# Patient Record
Sex: FEMALE | URBAN - METROPOLITAN AREA
[De-identification: names, ages, dates, MRNs, and addresses within clinical notes are randomized per-mention and may not be internally consistent; named-entity substitution may affect disease eponyms.]

---

## 2017-01-25 ENCOUNTER — APPOINTMENT (OUTPATIENT)
Dept: PHYSICAL THERAPY | Facility: REHABILITATION | Age: 67
End: 2017-01-25
Payer: MEDICARE

## 2017-01-25 PROCEDURE — G8991 OTHER PT/OT GOAL STATUS: HCPCS

## 2017-01-25 PROCEDURE — G8992 OTHER PT/OT  D/C STATUS: HCPCS

## 2017-01-25 PROCEDURE — 97140 MANUAL THERAPY 1/> REGIONS: CPT

## 2017-02-06 ENCOUNTER — APPOINTMENT (OUTPATIENT)
Dept: PHYSICAL THERAPY | Facility: REHABILITATION | Age: 67
End: 2017-02-06
Payer: MEDICARE

## 2017-02-06 PROCEDURE — 97140 MANUAL THERAPY 1/> REGIONS: CPT

## 2017-02-06 PROCEDURE — G8990 OTHER PT/OT CURRENT STATUS: HCPCS

## 2017-02-06 PROCEDURE — G8991 OTHER PT/OT GOAL STATUS: HCPCS

## 2017-02-06 PROCEDURE — 97162 PT EVAL MOD COMPLEX 30 MIN: CPT

## 2017-02-14 ENCOUNTER — APPOINTMENT (OUTPATIENT)
Dept: PHYSICAL THERAPY | Facility: REHABILITATION | Age: 67
End: 2017-02-14
Payer: MEDICARE

## 2017-02-14 PROCEDURE — 97140 MANUAL THERAPY 1/> REGIONS: CPT

## 2017-02-27 ENCOUNTER — APPOINTMENT (OUTPATIENT)
Dept: PHYSICAL THERAPY | Facility: REHABILITATION | Age: 67
End: 2017-02-27
Payer: MEDICARE

## 2017-02-27 PROCEDURE — 97110 THERAPEUTIC EXERCISES: CPT

## 2017-02-27 PROCEDURE — 97140 MANUAL THERAPY 1/> REGIONS: CPT

## 2017-03-16 ENCOUNTER — APPOINTMENT (OUTPATIENT)
Dept: PHYSICAL THERAPY | Facility: REHABILITATION | Age: 67
End: 2017-03-16
Payer: MEDICARE

## 2017-03-16 PROCEDURE — 97140 MANUAL THERAPY 1/> REGIONS: CPT

## 2017-03-16 PROCEDURE — 97110 THERAPEUTIC EXERCISES: CPT

## 2017-03-16 PROCEDURE — G8990 OTHER PT/OT CURRENT STATUS: HCPCS

## 2017-03-16 PROCEDURE — G8991 OTHER PT/OT GOAL STATUS: HCPCS

## 2017-03-24 ENCOUNTER — APPOINTMENT (OUTPATIENT)
Dept: PHYSICAL THERAPY | Facility: REHABILITATION | Age: 67
End: 2017-03-24
Payer: MEDICARE

## 2017-03-24 PROCEDURE — 97140 MANUAL THERAPY 1/> REGIONS: CPT

## 2017-04-03 ENCOUNTER — APPOINTMENT (OUTPATIENT)
Dept: PHYSICAL THERAPY | Facility: REHABILITATION | Age: 67
End: 2017-04-03
Payer: MEDICARE

## 2017-04-03 PROCEDURE — 97140 MANUAL THERAPY 1/> REGIONS: CPT

## 2017-04-06 ENCOUNTER — APPOINTMENT (OUTPATIENT)
Dept: PHYSICAL THERAPY | Facility: REHABILITATION | Age: 67
End: 2017-04-06
Payer: MEDICARE

## 2017-04-07 ENCOUNTER — APPOINTMENT (OUTPATIENT)
Dept: PHYSICAL THERAPY | Facility: REHABILITATION | Age: 67
End: 2017-04-07
Payer: MEDICARE

## 2017-04-21 ENCOUNTER — APPOINTMENT (OUTPATIENT)
Dept: PHYSICAL THERAPY | Facility: REHABILITATION | Age: 67
End: 2017-04-21
Payer: MEDICARE

## 2017-04-21 PROCEDURE — G8991 OTHER PT/OT GOAL STATUS: HCPCS

## 2017-04-21 PROCEDURE — G8992 OTHER PT/OT  D/C STATUS: HCPCS

## 2017-04-21 PROCEDURE — 97140 MANUAL THERAPY 1/> REGIONS: CPT

## 2024-06-26 NOTE — PROGRESS NOTES
PT Evaluation     Today's date: 2024  Patient name: Payton Ortiz  : 1950  MRN: [id number]  Referring provider: Alia Salmon C*  Dx:   Encounter Diagnosis     ICD-10-CM    1. Pelvic pain  R10.2       2. Pelvic floor tension  M62.89       3. Mixed stress and urge urinary incontinence  N39.46                      Assessment  Impairments: abnormal coordination, abnormal muscle firing, abnormal muscle tone, abnormal or restricted ROM, activity intolerance, impaired physical strength, lacks appropriate home exercise program and pain with function  Symptom irritability: moderate    Assessment details: Payton Ortiz is a 73 y.o. female who presents with concerns of pelvic pain, dyspareunia and urinary leakage. Examination reveals pelvic muscle tension, poor holding endurance, tenderness to palpation, poor cooridnation with deep core stabilizers.       The plan of care was discussed and included education regarding pelvic floor anatomy, explanation of exam technique, explanation of exam findings and discussion of treatment plan as well as the importance of patient compliance and adherence to physical therapy visits. Patient would benefit from skilled physical therapy services  to address deficits and ultimately meet goal of independent self management of condition.     Patient provided written and verbal consent for pelvic floor muscle exam: yes        Understanding of Dx/Px/POC: good     Prognosis: good    Goals    STGs to be met in 4 weeks:  * Patient will be compliant with introductory HEP as prescribed.  * Patient presents with good understanding of pelvic floor protective strategies to reduce intra-abdominal pressure against pelvic organs.    LTGs to be met by discharge:  * Patient will present with a NATHAN on the PFDI to indicate improved function. (nonsurg NATHAN > 13.5 pts, surg NATHAN > 45 pts)   * Demonstrates correct isolation and relaxation of pelvic floor to palpation without  "overflow from global stabilizers.  * Reports that she/he can cough/laugh/sneeze with at least 80% less bladder leakage.  * Implements relaxation strategies on a daily basis.  * Patient will report % reduction in discomfort with either palpation or intimacy.  * Patient will be proficient and compliant with use of vaginal dilator (s) for progression of self PFM stretching in 3-4 visits if indicated during treatment.    * Presents with improved nocturia to 1 toiletings/night for more thorough sleep.   * Patient will use pelvic floor muscles correctly during functional ADLs such as coughing, sneezn.     Plan  Referral necessary: Yes  Planned modality interventions: biofeedback    Planned therapy interventions: manual therapy, motor coordination training, neuromuscular re-education, strengthening, stretching, therapeutic activities, therapeutic exercise and home exercise program    Frequency: 1x week  Duration in weeks: 12  Plan of Care beginning date: 9/19/2024    PT Pelvic Floor Subjective:   History of Present Illness:   Previous patient returns with concern of bladder leakage and dyspareunia. She notes that she hasn't had intercourse in 6 months due to pain levels.     Current treatments include Traumeel injections, Noy Karyna treatment and estradiol cream.     Bladder: pad which she changes a few times a day \"it feels damp and I think I might drip,\" urinary hesitancy and feels as though she doesn't empty her bladder completely, UI with hard sneeze or with urge after 2 1/2 hours. Nocturia: 1-2 times   Hydration: water 60 ounces; tea - 2 cups    Reports that she was recently taking care of 2 elderly family members who passed away. Her stress levels have been high for this reason.      -----------------------------------------------------------------------------------------  4/11/24 note with SOLOMON Salmon:    HPI:   Payton presents for f/u with a hx of interlabial fissuring, dyspareunia, PFD, PVD and vulvar dermatoses " s/p radiation for anal CA.      PFMs She feels best when she is using Estradiol in conjunction with the MLT due to the radiation. Resumed Estradiol and has been consistent with BIW use to the vestibule and q wk PV. Also d/c'd Spirolactone which has likely helped to improve tissue integrity.    PFMs have been tighter with lack of MFR.  Has been having increased urinary frequency and incomplete emptying recently.    Has denied any dyspareunia since MLT. Completed 3 additional MLT tx's 3/24/22 & 5/13/22, 7/5/22 & 10/20/22, 5/25/23. Last annual 1/11/24 at 7.5 mo interval. Plan for 6-7 next.        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     Did f/u with Dr Mooney, [facility] u/s and exam nl and bleeding attributed to changes consistent with post radiation.   Quality of life: good    Social Support:     Lives in:  Multiple-level home    Lives with:  Spouse    Relationship status: /committed  Hand dominance:  Right  Diet and Exercise:      None due to time     Not exercising due to: lack of time  OB/ gyn History    Gestational History:     Number of prior pregnancies: 4    Number of term pregnancies: 2    Delivery Complications:  Living children are 50 and 49 yo. She lost 2 babies shortly after birth.   Bowel Function:     Voiding DIfficulties: constipation      Bowel Function comments:  Anal cancer in 2009 - treated with radiation and chemotherapy    Bowel frequency: daily    [place] Stool Scale: type 4 and type 3  Sexual Function:     Sexually Active:  Sexually active    Pain during intercourse: Yes      Lubrication Use: Yes    pain causes abstinence    Patient wishes to return to having intercourse: currently unable to have intercourse but wants toSexual function: vaginal painRecently saw GYN who recommended that she use coconut oil for vaginal dryness.     She has estradiol cream which she wants to talk to Alia about increasing amount.  ":  Pain:     Current pain ratin    At best pain ratin    At worst pain ratin    Location:  Vaginal, lower pelvic pain    Onset:  More than 2 years ago    Quality:  Sharp    Aggravating factors:  Unknown    Duration of symptoms:  6 to 24 hours    Relieving factors:  Change in position    Progression:  No change  Diagnostic Tests:     None    Treatments:     None    Patient Goals:     Patient goals for therapy:  Improved pain management, improved quality of life, relaxation, improved comfort, improved bladder or bowel function, fully empty bladder or bowels and decreased pain    Objective       Abdominal Assessment:      Abdominal Assessment: NA      General Perineum Exam:   Positive for perianal erythema.     General perineum exam comments: No discharge, irritation, organ prolapse or skin breakdown evident. Vitilago evident throughout perineum and gluteal muscles.     Reports burning pain along R caudal labia that rates 5-6/10. \"That is the area that hurts.\"     Also reports discomfort rating 4/10 along R IC and 5/10 along L IC and periurethral tissue.     Visual Inspection of Perineum:   Excursion of perineal body in cephalad direction with contraction of pelvic floor muscles (PFM): delayed  and weak  Excursion of perineal body in caudal direction with relaxation of pelvic floor muscles (PFM): weak and fair   Involuntary contraction with coughing: no  Involuntary relaxation with bearing down: yes  Cough reflex: no cough reflex  Sphincter Tone Resting: normal  Sphincter Tone Squeeze: normal  Sensation: intact  Tenderness: provoked    Pelvic Organ Prolapse   no pelvic organ prolapse   Atrophic changes: erythema noted       Pelvic Floor Muscle Exam:     Muscle Contraction: unable to perform   Breathing pattern with contraction: holding breath   Pelvic floor muscle relaxation is delayed and incomplete.   50% pelvic floor relaxation        PERFECT Score   Power right: 2/5   Power left: 2/5   Endurance " (seconds to max): 4   Repetitions (before fatigue): 4        pelvic floor exam consent given by patient    Pelvic exam completed: vaginally     SMEG Biofeedback   to be assessed next treatment    Graphical documentation:     Recently saw GYN who recommended that she use coconut oil for vaginal dryness.     She has estradiol cream which she wants to talk to Alia about increasing amount.              Precautions: There is no problem list on file for this patient.      PRO EVAL RE-EVAL DISCHARGE   PFDI 155.21     WILFREDO-18      VPQ      CPSI-NIH      PGQ          POC Expires Auth Status Start Date Exp Date PT Visit Limit DA expires DA provider                  Date of Service 6/27           Visits Used            Visits Remaining                        Manuals                                                            Neuro Re-Ed                                                                                                Ther Ex                                                                                    Ther Activity            Education Anatomy and POC                                                                        Modalities

## 2024-06-27 ENCOUNTER — EVALUATION (OUTPATIENT)
Dept: PHYSICAL THERAPY | Facility: REHABILITATION | Age: 74
End: 2024-06-27
Payer: COMMERCIAL

## 2024-06-27 DIAGNOSIS — R10.2 PELVIC PAIN: Primary | ICD-10-CM

## 2024-06-27 DIAGNOSIS — M62.89 PELVIC FLOOR TENSION: ICD-10-CM

## 2024-06-27 DIAGNOSIS — N39.46 MIXED STRESS AND URGE URINARY INCONTINENCE: ICD-10-CM

## 2024-06-27 PROCEDURE — 97530 THERAPEUTIC ACTIVITIES: CPT | Performed by: PHYSICAL THERAPIST

## 2024-06-27 NOTE — LETTER
2024    [facility]  [address] [facility] [address]    Patient: Payton Ortiz   YOB: 1950   Date of Visit: 2024     Encounter Diagnosis     ICD-10-CM    1. Pelvic pain  R10.2       2. Pelvic floor tension  M62.89       3. Mixed stress and urge urinary incontinence  N39.46           Dear Ms. Salmon:    Thank you for your recent referral of Payton Ortiz. Please review the attached evaluation summary from Payton's recent visit.     Please verify that you agree with the plan of care by signing the attached order.     If you have any questions or concerns, please do not hesitate to call.     I sincerely appreciate the opportunity to share in the care of one of your patients and hope to have another opportunity to work with you in the near future.       Sincerely,    Claudette Farris, PT      Referring Provider:      I certify that I have read the below Plan of Care and certify the need for these services furnished under this plan of treatment while under my care.                    [facility] Nassar  [address] [place] [address]  Via Fax: [phone]          PT Evaluation     Today's date: 2024  Patient name: Payton Ortiz  : 1950  MRN: [id number]  Referring provider: Alia Salmon C*  Dx:   Encounter Diagnosis     ICD-10-CM    1. Pelvic pain  R10.2       2. Pelvic floor tension  M62.89       3. Mixed stress and urge urinary incontinence  N39.46                      Assessment  Impairments: abnormal coordination, abnormal muscle firing, abnormal muscle tone, abnormal or restricted ROM, activity intolerance, impaired physical strength, lacks appropriate home exercise program and pain with function  Symptom irritability: moderate    Assessment details: Payton Ortiz is a 73 y.o. female who presents with concerns of pelvic pain, dyspareunia and urinary leakage. Examination reveals pelvic muscle tension,  poor holding endurance, tenderness to palpation, poor cooridnation with deep core stabilizers.       The plan of care was discussed and included education regarding pelvic floor anatomy, explanation of exam technique, explanation of exam findings and discussion of treatment plan as well as the importance of patient compliance and adherence to physical therapy visits. Patient would benefit from skilled physical therapy services  to address deficits and ultimately meet goal of independent self management of condition.     Patient provided written and verbal consent for pelvic floor muscle exam: yes        Understanding of Dx/Px/POC: good     Prognosis: good    Goals    STGs to be met in 4 weeks:  * Patient will be compliant with introductory HEP as prescribed.  * Patient presents with good understanding of pelvic floor protective strategies to reduce intra-abdominal pressure against pelvic organs.    LTGs to be met by discharge:  * Patient will present with a NATHAN on the PFDI to indicate improved function. (nonsurg NATHAN > 13.5 pts, surg NATHAN > 45 pts)   * Demonstrates correct isolation and relaxation of pelvic floor to palpation without overflow from global stabilizers.  * Reports that she/he can cough/laugh/sneeze with at least 80% less bladder leakage.  * Implements relaxation strategies on a daily basis.  * Patient will report % reduction in discomfort with either palpation or intimacy.  * Patient will be proficient and compliant with use of vaginal dilator (s) for progression of self PFM stretching in 3-4 visits if indicated during treatment.    * Presents with improved nocturia to 1 toiletings/night for more thorough sleep.   * Patient will use pelvic floor muscles correctly during functional ADLs such as coughing, sneezn.     Plan  Referral necessary: Yes  Planned modality interventions: biofeedback    Planned therapy interventions: manual therapy, motor coordination training, neuromuscular re-education,  "strengthening, stretching, therapeutic activities, therapeutic exercise and home exercise program    Frequency: 1x week  Duration in weeks: 12  Plan of Care beginning date: 9/19/2024    PT Pelvic Floor Subjective:   History of Present Illness:   Previous patient returns with concern of bladder leakage and dyspareunia. She notes that she hasn't had intercourse in 6 months due to pain levels.     Current treatments include Traumeel injections, Noy Karyna treatment and estradiol cream.     Bladder: pad which she changes a few times a day \"it feels damp and I think I might drip,\" urinary hesitancy and feels as though she doesn't empty her bladder completely, UI with hard sneeze or with urge after 2 1/2 hours. Nocturia: 1-2 times   Hydration: water 60 ounces; tea - 2 cups    Reports that she was recently taking care of 2 elderly family members who passed away. Her stress levels have been high for this reason.      -----------------------------------------------------------------------------------------  4/11/24 note with SOLOMON Salmon:    HPI:   Payton presents for f/u with a hx of interlabial fissuring, dyspareunia, PFD, PVD and vulvar dermatoses s/p radiation for anal CA.      PFMs She feels best when she is using Estradiol in conjunction with the MLT due to the radiation. Resumed Estradiol and has been consistent with BIW use to the vestibule and q wk PV. Also d/c'd Spirolactone which has likely helped to improve tissue integrity.    PFMs have been tighter with lack of MFR.  Has been having increased urinary frequency and incomplete emptying recently.    Has denied any dyspareunia since MLT. Completed 3 additional MLT tx's 3/24/22 & 5/13/22, 7/5/22 & 10/20/22, 5/25/23. Last annual 1/11/24 at 7.5 mo interval. Plan for 6-7 next.        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     Did f/u with Dr Mooney [facility] u/s and exam nl and bleeding attributed to " changes consistent with post radiation.   Quality of life: good    Social Support:     Lives in:  Multiple-level home    Lives with:  Spouse    Relationship status: /committed  Hand dominance:  Right  Diet and Exercise:      None due to time     Not exercising due to: lack of time  OB/ gyn History    Gestational History:     Number of prior pregnancies: 4    Number of term pregnancies: 2    Delivery Complications:  Living children are 50 and 49 yo. She lost 2 babies shortly after birth.   Bowel Function:     Voiding DIfficulties: constipation      Bowel Function comments:  Anal cancer in 2009 - treated with radiation and chemotherapy    Bowel frequency: daily    [place] Stool Scale: type 4 and type 3  Sexual Function:     Sexually Active:  Sexually active    Pain during intercourse: Yes      Lubrication Use: Yes    pain causes abstinence    Patient wishes to return to having intercourse: currently unable to have intercourse but wants toSexual function: vaginal painRecently saw GYN who recommended that she use coconut oil for vaginal dryness.     She has estradiol cream which she wants to talk to Alia about increasing amount. :  Pain:     Current pain ratin    At best pain ratin    At worst pain ratin    Location:  Vaginal, lower pelvic pain    Onset:  More than 2 years ago    Quality:  Sharp    Aggravating factors:  Unknown    Duration of symptoms:  6 to 24 hours    Relieving factors:  Change in position    Progression:  No change  Diagnostic Tests:     None    Treatments:     None    Patient Goals:     Patient goals for therapy:  Improved pain management, improved quality of life, relaxation, improved comfort, improved bladder or bowel function, fully empty bladder or bowels and decreased pain    Objective       Abdominal Assessment:      Abdominal Assessment: NA      General Perineum Exam:   Positive for perianal erythema.     General perineum exam comments: No discharge, irritation,  "organ prolapse or skin breakdown evident. Vitilago evident throughout perineum and gluteal muscles.     Reports burning pain along R caudal labia that rates 5-6/10. \"That is the area that hurts.\"     Also reports discomfort rating 4/10 along R IC and 5/10 along L IC and periurethral tissue.     Visual Inspection of Perineum:   Excursion of perineal body in cephalad direction with contraction of pelvic floor muscles (PFM): delayed  and weak  Excursion of perineal body in caudal direction with relaxation of pelvic floor muscles (PFM): weak and fair   Involuntary contraction with coughing: no  Involuntary relaxation with bearing down: yes  Cough reflex: no cough reflex  Sphincter Tone Resting: normal  Sphincter Tone Squeeze: normal  Sensation: intact  Tenderness: provoked    Pelvic Organ Prolapse   no pelvic organ prolapse   Atrophic changes: erythema noted       Pelvic Floor Muscle Exam:     Muscle Contraction: unable to perform   Breathing pattern with contraction: holding breath   Pelvic floor muscle relaxation is delayed and incomplete.   50% pelvic floor relaxation        PERFECT Score   Power right: 2/5   Power left: 2/5   Endurance (seconds to max): 4   Repetitions (before fatigue): 4        pelvic floor exam consent given by patient    Pelvic exam completed: vaginally     SMEG Biofeedback   to be assessed next treatment    Graphical documentation:     Recently saw GYN who recommended that she use coconut oil for vaginal dryness.     She has estradiol cream which she wants to talk to Alia about increasing amount.              Precautions: There is no problem list on file for this patient.      PRO EVAL RE-EVAL DISCHARGE   PFDI 155.21     [id number]      VPQ      CPSI-NIH      PGQ          POC Expires Auth Status Start Date Exp Date PT Visit Limit DA expires DA provider                  Date of Service 6/27           Visits Used            Visits Remaining                        Manuals                           "                                  Neuro Re-Ed                                                                                                Ther Ex                                                                                    Ther Activity            Education Anatomy and POC                                                                        Modalities

## 2024-06-28 PROCEDURE — 97162 PT EVAL MOD COMPLEX 30 MIN: CPT | Performed by: PHYSICAL THERAPIST

## 2024-07-01 ENCOUNTER — OFFICE VISIT (OUTPATIENT)
Dept: PHYSICAL THERAPY | Facility: REHABILITATION | Age: 74
End: 2024-07-01
Payer: COMMERCIAL

## 2024-07-01 DIAGNOSIS — R10.2 PELVIC PAIN: Primary | ICD-10-CM

## 2024-07-01 DIAGNOSIS — N39.46 MIXED STRESS AND URGE URINARY INCONTINENCE: ICD-10-CM

## 2024-07-01 DIAGNOSIS — M62.89 PELVIC FLOOR TENSION: ICD-10-CM

## 2024-07-01 PROCEDURE — 97112 NEUROMUSCULAR REEDUCATION: CPT | Performed by: PHYSICAL THERAPIST

## 2024-07-01 NOTE — PROGRESS NOTES
"Daily Note     Today's date: 2024  Patient name: Payton Ortiz  : 1950  MRN: [id number]  Referring provider: Claudette Farris, PT  Dx:   Encounter Diagnosis     ICD-10-CM    1. Pelvic pain  R10.2       2. Pelvic floor tension  M62.89       3. Mixed stress and urge urinary incontinence  N39.46           Previous patient returns with concern of bladder leakage and dyspareunia. She notes that she hasn't had intercourse in 6 months due to pain levels.     Current treatments include Traumeel injections, Noy Karyna treatment and estradiol cream.     Bladder: pad which she changes a few times a day \"it feels damp and I think I might drip,\" urinary hesitancy and feels as though she doesn't empty her bladder completely, UI with hard sneeze or with urge after 2 1/2 hours. Nocturia: 1-2 times   Hydration: water 60 ounces; tea - 2 cups    Reports that she was recently taking care of 2 elderly family members who passed away. Her stress levels have been high for this reason.               Subjective: Patient will be going for Noy Karyna vaginal treatment with E [facility]steriss on 24.      Objective: See treatment diary below    Biofeedback performed in supine hooklying. Patient presents with low work tone. Performed 5 second active PFM contractions followed by 10 seconds of rest for 10 repetitions. Muscle activity during work phase measured 8.6 uV on average with the goal being > 12.0uV and muscle activity during rest phase measured 2.2 uV on average with the goal being < 2.5uV.     Issued HEP of 5 second holds, 10 seconds of rest to perform 10 times in supine position with emphasis on work and rest phase. This is to be performed 2-3 times per day.         Assessment: Tolerated treatment well. Patient would benefit from continued PT      Plan: Continue per plan of care.      Precautions: There is no problem list on file for this patient.      PRO EVAL RE-EVAL DISCHARGE   PFDI 155.21     [id number]      VPQ    "   [facility]      PGQ          POC Expires Auth Status Start Date Exp Date PT Visit Limit DA expires DA provider                  Date of Service 6/27 7/1          Visits Used            Visits Remaining                        Manuals                                                            Neuro Re-Ed            BF  55'                                                                                  Ther Ex                                                                                    Ther Activity            Education Anatomy and POC                                                                        Modalities

## 2024-07-13 NOTE — PROGRESS NOTES
"Daily Note     Today's date: 7/15/2024  Patient name: Payton Ortiz  : 1950  MRN: [id number]  Referring provider: Claudette Farris, PT  Dx:   Encounter Diagnosis     ICD-10-CM    1. Pelvic pain  R10.2       2. Pelvic floor tension  M62.89       3. Mixed stress and urge urinary incontinence  N39.46           Previous patient returns with concern of bladder leakage and dyspareunia. She notes that she hasn't had intercourse in 6 months due to pain levels.     Current treatments include Traumeel injections, Noy Karyna treatment and estradiol cream.     Bladder: pad which she changes a few times a day \"it feels damp and I think I might drip,\" urinary hesitancy and feels as though she doesn't empty her bladder completely, UI with hard sneeze or with urge after 2 1/2 hours. Nocturia: 1-2 times   Hydration: water 60 ounces; tea - 2 cups    Reports that she was recently taking care of 2 elderly family members who passed away. Her stress levels have been high for this reason.    Start Time: 1405          Subjective: Had Noy Karyna last week (every 6 months). She noticed a small amount of improvement with bladder control.       Objective: See treatment diary below    Biofeedback performed in supine hooklying. Patient presents with low work tone. Performed 5 second active PFM contractions followed by 10 seconds of rest for 10 repetitions. Muscle activity during work phase measured 8.6 uV on average with the goal being > 12.0uV and muscle activity during rest phase measured 2.2 uV on average with the goal being < 2.5uV.     Issued HEP of 5 second holds, 10 seconds of rest to perform 10 times in supine position with emphasis on work and rest phase. This is to be performed 2-3 times per day.         Assessment: Tolerated treatment well. Patient would benefit from continued PT. Less pain with manual release this visit and she did well with conversation and breath work.       Plan: Continue per plan of care.    "   Precautions: There is no problem list on file for this patient.      PRO EVAL RE-EVAL DISCHARGE   PFDI 155.21     [id number]      VPQ      CPSI-NIH      PGQ          POC Expires Auth Status Start Date Exp Date PT Visit Limit DA expires DA provider                  Date of Service 6/27 7/1 7/15         Visits Used            Visits Remaining                        Manuals            PFM release   30'         Cuing for correct C/R/bear down   15'                                 Neuro Re-Ed            BF  55'                                                                                  Ther Ex            Nustep warmup   L4x8'                                                                     Ther Activity            Education Anatomy and POC                                                                        Modalities

## 2024-07-15 ENCOUNTER — OFFICE VISIT (OUTPATIENT)
Dept: PHYSICAL THERAPY | Facility: REHABILITATION | Age: 74
End: 2024-07-15
Payer: COMMERCIAL

## 2024-07-15 DIAGNOSIS — M62.89 PELVIC FLOOR TENSION: ICD-10-CM

## 2024-07-15 DIAGNOSIS — N39.46 MIXED STRESS AND URGE URINARY INCONTINENCE: ICD-10-CM

## 2024-07-15 DIAGNOSIS — R10.2 PELVIC PAIN: Primary | ICD-10-CM

## 2024-07-15 PROCEDURE — 97140 MANUAL THERAPY 1/> REGIONS: CPT | Performed by: PHYSICAL THERAPIST

## 2024-07-15 PROCEDURE — 97110 THERAPEUTIC EXERCISES: CPT | Performed by: PHYSICAL THERAPIST

## 2024-07-22 ENCOUNTER — OFFICE VISIT (OUTPATIENT)
Dept: PHYSICAL THERAPY | Facility: REHABILITATION | Age: 74
End: 2024-07-22
Payer: COMMERCIAL

## 2024-07-22 DIAGNOSIS — R10.2 PELVIC PAIN: Primary | ICD-10-CM

## 2024-07-22 DIAGNOSIS — M62.89 PELVIC FLOOR TENSION: ICD-10-CM

## 2024-07-22 DIAGNOSIS — N39.46 MIXED STRESS AND URGE URINARY INCONTINENCE: ICD-10-CM

## 2024-07-22 PROCEDURE — 97140 MANUAL THERAPY 1/> REGIONS: CPT | Performed by: PHYSICAL THERAPIST

## 2024-07-22 PROCEDURE — 97110 THERAPEUTIC EXERCISES: CPT | Performed by: PHYSICAL THERAPIST

## 2024-07-22 NOTE — PROGRESS NOTES
"Daily Note     Today's date: 2024  Patient name: Payton Ortiz  : 1950  MRN: [id number]  Referring provider: Claudette Farris, PT  Dx:   Encounter Diagnosis     ICD-10-CM    1. Pelvic pain  R10.2       2. Pelvic floor tension  M62.89       3. Mixed stress and urge urinary incontinence  N39.46           Previous patient returns with concern of bladder leakage and dyspareunia. She notes that she hasn't had intercourse in 6 months due to pain levels.     Current treatments include Traumeel injections, Noy Karyna treatment and estradiol cream.     Bladder: pad which she changes a few times a day \"it feels damp and I think I might drip,\" urinary hesitancy and feels as though she doesn't empty her bladder completely, UI with hard sneeze or with urge after 2 1/2 hours. Nocturia: 1-2 times   Hydration: water 60 ounces; tea - 2 cups    Reports that she was recently taking care of 2 elderly family members who passed away. Her stress levels have been high for this reason.               Subjective: Had Noy Karyna last week (every 6 months). She noticed a small amount of improvement with bladder control.       Objective: See treatment diary below    Biofeedback performed in supine hooklying. Patient presents with low work tone. Performed 5 second active PFM contractions followed by 10 seconds of rest for 10 repetitions. Muscle activity during work phase measured 8.6 uV on average with the goal being > 12.0uV and muscle activity during rest phase measured 2.2 uV on average with the goal being < 2.5uV.     Issued HEP of 5 second holds, 10 seconds of rest to perform 10 times in supine position with emphasis on work and rest phase. This is to be performed 2-3 times per day.         Assessment: Tolerated treatment well. Patient would benefit from continued PT. Pain,burning, tenderness, tension along L>R which reduced with MT and focused C/R. Advised not to stop urine mid-stream. Encouraged to use her finger or " vibrator at home for C/R to gauge how she's doing.       Plan: Continue per plan of care.      Precautions: There is no problem list on file for this patient.      PRO EVAL RE-EVAL DISCHARGE   PFDI 155.21     [id number]      VPQ      CPSI-NIH      PGQ          POC Expires Auth Status Start Date Exp Date PT Visit Limit DA expires DA provider                  Date of Service 6/27 7/1 7/15 7/22        Visits Used            Visits Remaining                        Manuals            PFM release   30' 30'        Cuing for correct C/R/bear down   15' 15' advised to use vibrator or finger at home                                Neuro Re-Ed            BF  55'                                                                                  Ther Ex            Nustep warmup   L4x8' L4-8'                                                                    Ther Activity            Education Anatomy and POC                                                                        Modalities

## 2024-07-29 ENCOUNTER — APPOINTMENT (OUTPATIENT)
Dept: PHYSICAL THERAPY | Facility: REHABILITATION | Age: 74
End: 2024-07-29
Payer: COMMERCIAL

## 2024-07-31 ENCOUNTER — OFFICE VISIT (OUTPATIENT)
Dept: PHYSICAL THERAPY | Facility: REHABILITATION | Age: 74
End: 2024-07-31
Payer: COMMERCIAL

## 2024-07-31 DIAGNOSIS — M62.89 PELVIC FLOOR TENSION: ICD-10-CM

## 2024-07-31 DIAGNOSIS — N39.46 MIXED STRESS AND URGE URINARY INCONTINENCE: ICD-10-CM

## 2024-07-31 DIAGNOSIS — R10.2 PELVIC PAIN: Primary | ICD-10-CM

## 2024-07-31 PROCEDURE — 97140 MANUAL THERAPY 1/> REGIONS: CPT | Performed by: PHYSICAL THERAPIST

## 2024-07-31 PROCEDURE — 97110 THERAPEUTIC EXERCISES: CPT | Performed by: PHYSICAL THERAPIST

## 2024-07-31 NOTE — PROGRESS NOTES
"Daily Note     Today's date: 2024  Patient name: Payton Ortiz  : 1950  MRN: [id number]  Referring provider: Claudette Farris, PT  Dx:   Encounter Diagnosis     ICD-10-CM    1. Pelvic pain  R10.2       2. Pelvic floor tension  M62.89       3. Mixed stress and urge urinary incontinence  N39.46           Previous patient returns with concern of bladder leakage and dyspareunia. She notes that she hasn't had intercourse in 6 months due to pain levels.     Current treatments include Traumeel injections, Noy Karyna treatment and estradiol cream.     Bladder: pad which she changes a few times a day \"it feels damp and I think I might drip,\" urinary hesitancy and feels as though she doesn't empty her bladder completely, UI with hard sneeze or with urge after 2 1/2 hours. Nocturia: 1-2 times   Hydration: water 60 ounces; tea - 2 cups    Reports that she was recently taking care of 2 elderly family members who passed away. Her stress levels have been high for this reason.    Start Time: 1400          Subjective: Has a breast biopsy and noted vaginal bleeding afterwards. She will be seeing new GYN next week and plans to discuss with her.       Objective: See treatment diary below    Biofeedback performed in supine hooklying. Patient presents with low work tone. Performed 5 second active PFM contractions followed by 10 seconds of rest for 10 repetitions. Muscle activity during work phase measured 8.6 uV on average with the goal being > 12.0uV and muscle activity during rest phase measured 2.2 uV on average with the goal being < 2.5uV.     Issued HEP of 5 second holds, 10 seconds of rest to perform 10 times in supine position with emphasis on work and rest phase. This is to be performed 2-3 times per day.         Assessment: Tolerated treatment well. Patient would benefit from continued PT. Pain,burning along R Layer 1 and R IC with very nice release along IC. Able to engage and relax voliationally fairly well. " Discussed fact that she could bring coconut oil in next visit for manual work to see if it helps with burning as she is unsure if slippery stuff lubricant is bothering her skin.     Plan: Continue per plan of care.      Precautions: There is no problem list on file for this patient.      PRO EVAL RE-EVAL DISCHARGE   PFDI 155.21     [id number]      VPQ      CPSI-NIH      PGQ          POC Expires Auth Status Start Date Exp Date PT Visit Limit DA expires DA provider                  Date of Service 6/27 7/1 7/15 7/22 7/31       Visits Used            Visits Remaining                        Manuals            PFM release   30' 30' 45'       Cuing for correct C/R/bear down   15' 15' advised to use vibrator or finger at home                                Neuro Re-Ed            BF  55'                                                                                  Ther Ex            Nustep warmup   L4x8' L4-8' L3x10'                                                                   Ther Activity            Education Anatomy and POC                                                                        Modalities

## 2024-08-08 ENCOUNTER — OFFICE VISIT (OUTPATIENT)
Dept: PHYSICAL THERAPY | Facility: REHABILITATION | Age: 74
End: 2024-08-08
Payer: COMMERCIAL

## 2024-08-08 DIAGNOSIS — R10.2 PELVIC PAIN: Primary | ICD-10-CM

## 2024-08-08 DIAGNOSIS — M62.89 PELVIC FLOOR TENSION: ICD-10-CM

## 2024-08-08 DIAGNOSIS — N39.46 MIXED STRESS AND URGE URINARY INCONTINENCE: ICD-10-CM

## 2024-08-08 PROCEDURE — 97112 NEUROMUSCULAR REEDUCATION: CPT

## 2024-08-08 PROCEDURE — 97140 MANUAL THERAPY 1/> REGIONS: CPT

## 2024-08-08 NOTE — PROGRESS NOTES
"Daily Note     Today's date: 2024  Patient name: Payton Ortiz  : 1950  MRN: [id number]  Referring provider: Claudette Farris, PT  Dx:   Encounter Diagnosis     ICD-10-CM    1. Pelvic pain  R10.2       2. Pelvic floor tension  M62.89       3. Mixed stress and urge urinary incontinence  N39.46             Previous patient returns with concern of bladder leakage and dyspareunia. She notes that she hasn't had intercourse in 6 months due to pain levels.     Current treatments include Traumeel injections, Noy Karyna treatment and estradiol cream.     Bladder: pad which she changes a few times a day \"it feels damp and I think I might drip,\" urinary hesitancy and feels as though she doesn't empty her bladder completely, UI with hard sneeze or with urge after 2 1/2 hours. Nocturia: 1-2 times   Hydration: water 60 ounces; tea - 2 cups    Reports that she was recently taking care of 2 elderly family members who passed away. Her stress levels have been high for this reason.    Start Time: 1415  Stop Time: 1500  Total time in clinic (min): 45 minutes    Subjective: Bleeding has resolved.  Does feel she is having more lower abdominal pressure since starting PT, unsure if its related. Pt brought in coconut oil for the intravaginal stretching.     Objective: See treatment diary below    Biofeedback performed in supine hooklying. Patient presents with low work tone. Performed 5 second active PFM contractions followed by 10 seconds of rest for 10 repetitions. Muscle activity during work phase measured 8.6 uV on average with the goal being > 12.0uV and muscle activity during rest phase measured 2.2 uV on average with the goal being < 2.5uV.     Issued HEP of 5 second holds, 10 seconds of rest to perform 10 times in supine position with emphasis on work and rest phase. This is to be performed 2-3 times per day.         Assessment: Tolerated treatment well. Patient would benefit from continued PT. Use coconut oil as " planned which was well tolerated, encouraged to share with us how she felt after since it usually does not bother her during the session.  R side more tense as per patient but responded well to manual techniques.  Practiced contract, relax, gentle bear down.  Appears delayed with relaxation, cueing given to improve awareness. Performed over lower abdominal area fascia decompression post intravaginal.     Plan: Continue per plan of care.      Precautions: There is no problem list on file for this patient.      PRO EVAL RE-EVAL DISCHARGE   PFDI 155.21     [id number]      VPQ      CPSI-NIH      PGQ          POC Expires Auth Status Start Date Exp Date PT Visit Limit DA expires DA provider                  Date of Service 6/27 7/1 7/15 7/22 7/31 8/8      Visits Used            Visits Remaining                        Manuals            PFM release   30' 30' 45' 35' w/ coconut oil      Cuing for correct C/R/bear down   15' 15' advised to use vibrator or finger at home                                Neuro Re-Ed            BF  55'          C/r/bear down      10'                                                                  Ther Ex            Nustep warmup   L4x8' L4-8' L3x10'                                                                   Ther Activity            Education Anatomy and POC                                                                        Modalities

## 2024-08-12 ENCOUNTER — OFFICE VISIT (OUTPATIENT)
Dept: PHYSICAL THERAPY | Facility: REHABILITATION | Age: 74
End: 2024-08-12
Payer: COMMERCIAL

## 2024-08-12 DIAGNOSIS — N39.46 MIXED STRESS AND URGE URINARY INCONTINENCE: ICD-10-CM

## 2024-08-12 DIAGNOSIS — M62.89 PELVIC FLOOR TENSION: ICD-10-CM

## 2024-08-12 DIAGNOSIS — R10.2 PELVIC PAIN: Primary | ICD-10-CM

## 2024-08-12 PROCEDURE — 97140 MANUAL THERAPY 1/> REGIONS: CPT | Performed by: PHYSICAL THERAPIST

## 2024-08-12 NOTE — PROGRESS NOTES
"Daily Note     Today's date: 2024  Patient name: Payton Ortiz  : 1950  MRN: [id number]  Referring provider: Claudette Farris, PT  Dx:   Encounter Diagnosis     ICD-10-CM    1. Pelvic pain  R10.2       2. Pelvic floor tension  M62.89       3. Mixed stress and urge urinary incontinence  N39.46             Previous patient returns with concern of bladder leakage and dyspareunia. She notes that she hasn't had intercourse in 6 months due to pain levels.     Current treatments include Traumeel injections, Noy Karyna treatment and estradiol cream.     Bladder: pad which she changes a few times a day \"it feels damp and I think I might drip,\" urinary hesitancy and feels as though she doesn't empty her bladder completely, UI with hard sneeze or with urge after 2 1/2 hours. Nocturia: 1-2 times   Hydration: water 60 ounces; tea - 2 cups    Reports that she was recently taking care of 2 elderly family members who passed away. Her stress levels have been high for this reason.    Start Time: 1500          Subjective: Reports compliance with pelvic floor stretches 2-3 times a day. She saw gyn last week and is having a pelvic ultrasound tomorrow for abdominal pain.Has started using leg pumps for venous insufficiency. She received nebulizer from pulmonologist due to \"reactive airways and touch of asthma\"but she is having trouble remembering to use it.         Objective: See treatment diary below    Biofeedback performed in supine hooklying. Patient presents with low work tone. Performed 5 second active PFM contractions followed by 10 seconds of rest for 10 repetitions. Muscle activity during work phase measured 8.6 uV on average with the goal being > 12.0uV and muscle activity during rest phase measured 2.2 uV on average with the goal being < 2.5uV.     Issued HEP of 5 second holds, 10 seconds of rest to perform 10 times in supine position with emphasis on work and rest phase. This is to be performed 2-3 times per " day.         Assessment: Tolerated treatment well. Patient would benefit from continued PT. VM along DC and sigmoid with fullness noted along sigmoid region (this softened mildly). She reported soreness along R IC with palpation.     Plan: Continue per plan of care.      Precautions: There is no problem list on file for this patient.      PRO EVAL RE-EVAL DISCHARGE   PFDI 155.21     [id number]      VPQ      CPSI-NIH      PGQ          POC Expires Auth Status Start Date Exp Date PT Visit Limit DA expires DA provider                  Date of Service 6/27 7/1 7/15 7/22 7/31 8/8 8/12     Visits Used            Visits Remaining                        Manuals            PFM release   30' 30' 45' 35' w/ coconut oil 40'     Cuing for correct C/R/bear down   15' 15' advised to use vibrator or finger at home        VM       15' as charted                 Neuro Re-Ed            BF  55'          C/r/bear down      10'                                                                  Ther Ex            Nustep warmup   L4x8' L4-8' L3x10'                                                                   Ther Activity            Education Anatomy and POC                                                                        Modalities

## 2024-08-13 ENCOUNTER — APPOINTMENT (OUTPATIENT)
Dept: PHYSICAL THERAPY | Facility: REHABILITATION | Age: 74
End: 2024-08-13
Payer: COMMERCIAL

## 2024-08-20 ENCOUNTER — OFFICE VISIT (OUTPATIENT)
Dept: PHYSICAL THERAPY | Facility: REHABILITATION | Age: 74
End: 2024-08-20
Payer: COMMERCIAL

## 2024-08-20 DIAGNOSIS — N39.46 MIXED STRESS AND URGE URINARY INCONTINENCE: ICD-10-CM

## 2024-08-20 DIAGNOSIS — M62.89 PELVIC FLOOR TENSION: ICD-10-CM

## 2024-08-20 DIAGNOSIS — R10.2 PELVIC PAIN: Primary | ICD-10-CM

## 2024-08-20 PROCEDURE — 97110 THERAPEUTIC EXERCISES: CPT | Performed by: PHYSICAL THERAPIST

## 2024-08-20 PROCEDURE — 97140 MANUAL THERAPY 1/> REGIONS: CPT | Performed by: PHYSICAL THERAPIST

## 2024-08-20 NOTE — PROGRESS NOTES
"Daily Note     Today's date: 2024  Patient name: Payton Ortiz  : 1950  MRN: [id number]  Referring provider: Claudette Farris, PT  Dx:   Encounter Diagnosis     ICD-10-CM    1. Pelvic pain  R10.2       2. Pelvic floor tension  M62.89       3. Mixed stress and urge urinary incontinence  N39.46             Previous patient returns with concern of bladder leakage and dyspareunia. She notes that she hasn't had intercourse in 6 months due to pain levels.     Current treatments include Traumeel injections, Noy Karyna treatment and estradiol cream.     Bladder: pad which she changes a few times a day \"it feels damp and I think I might drip,\" urinary hesitancy and feels as though she doesn't empty her bladder completely, UI with hard sneeze or with urge after 2 1/2 hours. Nocturia: 1-2 times   Hydration: water 60 ounces; tea - 2 cups    Reports that she was recently taking care of 2 elderly family members who passed away. Her stress levels have been high for this reason.               Subjective: TVUS revealed thickened endometrium so she is having hesteroscopy and D&C (to be scheduled after gyn appt tomorrow)started using leg pumps for venous insufficiency. She received nebulizer from pulmonologist due to \"reactive airways and touch of asthma\"but she is having trouble remembering to use it.         Objective: See treatment diary below    Biofeedback performed in supine hooklying. Patient presents with low work tone. Performed 5 second active PFM contractions followed by 10 seconds of rest for 10 repetitions. Muscle activity during work phase measured 8.6 uV on average with the goal being > 12.0uV and muscle activity during rest phase measured 2.2 uV on average with the goal being < 2.5uV.     Issued HEP of 5 second holds, 10 seconds of rest to perform 10 times in supine position with emphasis on work and rest phase. This is to be performed 2-3 times per day.         Assessment: Tolerated treatment well. " Patient would benefit from continued PT. Less internal muscle tension and pain with palpation.we discussed fact that she could be intimate if she wanted to as her PFM was responding fairly well today.     Plan: Continue per plan of care.      Precautions: There is no problem list on file for this patient.      PRO EVAL RE-EVAL DISCHARGE   PFDI 155.21 119.8    WILFREDO-18      VPQ      CPSI-NIH      PGQ          POC Expires Auth Status Start Date Exp Date PT Visit Limit DA expires DA provider                Date of Service 6/27 7/1 7/15 7/22 7/31 8/8 8/12 8/20    Visits Used            Visits Remaining                        Manuals            PFM release   30' 30' 45' 35' w/ coconut oil 40' 30' 1/10 pain     Cuing for correct C/R/bear down   15' 15' advised to use vibrator or finger at home        VM       15' as charted 15'                Neuro Re-Ed            BF  55'          C/r/bear down      10'                                                                  Ther Ex            Nustep warmup   L4x8' L4-8' L3x10'   10'                                                                Ther Activity            Education Anatomy and POC                                                                        Modalities

## 2024-10-31 ENCOUNTER — EVALUATION (OUTPATIENT)
Dept: PHYSICAL THERAPY | Facility: REHABILITATION | Age: 74
End: 2024-10-31
Payer: COMMERCIAL

## 2024-10-31 DIAGNOSIS — R10.2 PELVIC PAIN: Primary | ICD-10-CM

## 2024-10-31 DIAGNOSIS — N39.46 MIXED INCONTINENCE URGE AND STRESS: ICD-10-CM

## 2024-10-31 DIAGNOSIS — M62.89 PELVIC FLOOR TENSION: ICD-10-CM

## 2024-10-31 PROCEDURE — 97164 PT RE-EVAL EST PLAN CARE: CPT | Performed by: PHYSICAL THERAPIST

## 2024-10-31 PROCEDURE — 97140 MANUAL THERAPY 1/> REGIONS: CPT | Performed by: PHYSICAL THERAPIST

## 2024-10-31 NOTE — PROGRESS NOTES
PT Re-Evaluation     Today's date: 10/31/2024  Patient name: Payton Ortiz  : 1950  MRN: [id number]  Referring provider: Claudette Farris, JOHN  Dx:   Encounter Diagnosis     ICD-10-CM    1. Pelvic pain  R10.2       2. Pelvic floor tension  M62.89       3. Mixed incontinence urge and stress  N39.46                        Assessment  Impairments: abnormal coordination, abnormal muscle firing, abnormal muscle tone, abnormal or restricted ROM, activity intolerance, impaired physical strength, lacks appropriate home exercise program and pain with function  Symptom irritability: moderate    Assessment details: Payton Ortiz is a 73 y.o. female who presents with concerns of pelvic pain, dyspareunia and urinary leakage. Examination reveals pelvic muscle tension, poor holding endurance, tenderness to palpation, poor cooridnation with deep core stabilizers.       The plan of care was discussed and included education regarding pelvic floor anatomy, explanation of exam technique, explanation of exam findings and discussion of treatment plan as well as the importance of patient compliance and adherence to physical therapy visits. Patient would benefit from skilled physical therapy services  to address deficits and ultimately meet goal of independent self management of condition.     Patient provided written and verbal consent for pelvic floor muscle exam: yes        Understanding of Dx/Px/POC: good     Prognosis: good    Goals    STGs to be met in 4 weeks:  * Patient will be compliant with introductory HEP as prescribed.  * Patient presents with good understanding of pelvic floor protective strategies to reduce intra-abdominal pressure against pelvic organs.    LTGs to be met by discharge:  * Patient will present with a NATHAN on the PFDI to indicate improved function. (nonsurg NATHAN > 13.5 pts, surg NATHAN > 45 pts)   * Demonstrates correct isolation and relaxation of pelvic floor to palpation without overflow from  "global stabilizers.  * Reports that she/he can cough/laugh/sneeze with at least 80% less bladder leakage.  * Implements relaxation strategies on a daily basis.  * Patient will report % reduction in discomfort with either palpation or intimacy.  * Patient will be proficient and compliant with use of vaginal dilator (s) for progression of self PFM stretching in 3-4 visits if indicated during treatment.    * Presents with improved nocturia to 1 toiletings/night for more thorough sleep.   * Patient will use pelvic floor muscles correctly during functional ADLs such as coughing, sneezn.     Plan  Referral necessary: Yes  Planned modality interventions: biofeedback    Planned therapy interventions: manual therapy, motor coordination training, neuromuscular re-education, strengthening, stretching, therapeutic activities, therapeutic exercise and home exercise program    Frequency: 1x week  Duration in weeks: 12  Plan of Care beginning date: 9/19/2024    PT Pelvic Floor Subjective:   History of Present Illness:   Patient returns after a span of time due to busy schedule. She continues with concern of bladder leakage (often insensateI haven't been able to do any of my self release work with my dilator.\"    Pain is with touch along vaginal introitus \"If I don't touch it, it doesn't hurt.\" She reports that she has very little pain but the last time she had intercourse she bled. She underwent pelvic ultrasound which indicated 10.6mm endometrial lining. Hysteroscopy attempted but but MD saucedand't insert myosure  but she was able to do a D&C which revealed wnl endometrial lining.     She was placed on an antibiotic for recent UTI. She is wearing menstrual pads to control insensate bladder leakage.          ultrasound pain with sexual intercourse and bleeding.  She had a pelvic CAT scan performed due to L suprapubic pain. She reports that it di            Previous patient returns with concern of bladder leakage and dyspareunia. She " "notes that she hasn't had intercourse in 6 months due to pain levels.     Current treatments include Traumeel injections, Noy Karyna treatment and estradiol cream.     Bladder: pad which she changes a few times a day \"it feels damp and I think I might drip,\" urinary hesitancy and feels as though she doesn't empty her bladder completely, UI with hard sneeze or with urge after 2 1/2 hours. Nocturia: 1-2 times   Hydration: water 60 ounces; tea - 2 cups    Reports that she was recently taking care of 2 elderly family members who passed away. Her stress levels have been high for this reason.      -----------------------------------------------------------------------------------------  4/11/24 note with SOLOMON Salmon:    HPI:   Payton presents for f/u with a hx of interlabial fissuring, dyspareunia, PFD, PVD and vulvar dermatoses s/p radiation for anal CA.      PFMs She feels best when she is using Estradiol in conjunction with the MLT due to the radiation. Resumed Estradiol and has been consistent with BIW use to the vestibule and q wk PV. Also d/c'd Spirolactone which has likely helped to improve tissue integrity.    PFMs have been tighter with lack of MFR.  Has been having increased urinary frequency and incomplete emptying recently.    Has denied any dyspareunia since MLT. Completed 3 additional MLT tx's 3/24/22 & 5/13/22, 7/5/22 & 10/20/22, 5/25/23. Last annual 1/11/24 at 7.5 mo interval. Plan for 6-7 next.        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     Did f/u with Dr Mooney, [facility] u/s and exam nl and bleeding attributed to changes consistent with post radiation.   Quality of life: good    Social Support:     Lives in:  Multiple-level home    Lives with:  Spouse    Relationship status: /committed  Hand dominance:  Right  Diet and Exercise:      None due to time     Not exercising due to: lack of time  OB/ gyn History    Gestational " History:     Number of prior pregnancies: 4    Number of term pregnancies: 2    Delivery Complications:  Living children are 50 and 49 yo. She lost 2 babies shortly after birth.   Bladder Function:      Voiding Difficulties comments:     Voiding frequency: every 1-2 hours and every 3-4 hours    Urinary leakage: urine leakage    Urinary leakage aggravated by: sneezing and walking to the bathroom    Nocturia (episodes per night): 2 and 1    Intake (ounces): Water intake (oz): 32 ounces.   Bowel Function:     Voiding DIfficulties: constipation      Bowel Function comments:  Anal cancer in 2009 - treated with radiation and chemotherapy    Bowel frequency: multiple times a day    [place]yer Stool Scale: type 4 and type 3  Sexual Function:     Sexually Active:  Sexually active    Pain during intercourse: Yes      Lubrication Use: Yes    pain causes abstinence    Patient wishes to return to having intercourse: currently unable to have intercourse but wants toSexual function: vaginal painRecently saw GYN who recommended that she use coconut oil for vaginal dryness.     She has estradiol cream which she wants to talk to Alia about increasing amount. :  Pain:     Current pain ratin    At best pain ratin    At worst pain rating:  10    Location:  Vaginal, lower pelvic pain    Onset:  More than 2 years ago    Quality:  Sharp and burning    Aggravating factors:  Unknown    Duration of symptoms:  6 to 24 hours    Relieving factors:  Change in position    Progression:  No change  Diagnostic Tests:     None    Treatments:     None    Patient Goals:     Patient goals for therapy:  Improved pain management, improved quality of life, relaxation, improved comfort, improved bladder or bowel function, fully empty bladder or bowels and decreased pain    Objective       Abdominal Assessment:      Abdominal Assessment: NA      General Perineum Exam:   Positive for perianal erythema.     General perineum exam comments: No  "discharge, irritation, organ prolapse or skin breakdown evident. Vitilago evident throughout perineum and gluteal muscles.     Reports burning pain along R caudal labia that rates 5-6/10. \"That is the area that hurts.\"     Also reports discomfort rating 4/10 along R IC and 5/10 along L IC and periurethral tissue.     Visual Inspection of Perineum:   Excursion of perineal body in cephalad direction with contraction of pelvic floor muscles (PFM): delayed  and weak  Excursion of perineal body in caudal direction with relaxation of pelvic floor muscles (PFM): weak and fair   Involuntary contraction with coughing: no  Involuntary relaxation with bearing down: yes  Cough reflex: no cough reflex  Sphincter Tone Resting: normal  Sphincter Tone Squeeze: normal  Sensation: intact  Tenderness: provoked    Pelvic Organ Prolapse   no pelvic organ prolapse   Atrophic changes: erythema noted       Pelvic Floor Muscle Exam:     Muscle Contraction: unable to perform   Breathing pattern with contraction: holding breath   Pelvic floor muscle relaxation is delayed and incomplete.   50% pelvic floor relaxation        PERFECT Score   Power right: 2/5   Power left: 2/5   Endurance (seconds to max): 4   Repetitions (before fatigue): 4        pelvic floor exam consent given by patient    Pelvic exam completed: vaginally     SMEG Biofeedback   to be assessed next treatment    Graphical documentation:     Recently saw GYN who recommended that she use coconut oil for vaginal dryness.     She has estradiol cream which she wants to talk to Alia about increasing amount.              Precautions: There is no problem list on file for this patient.      PRO EVAL RE-EVAL DISCHARGE   PFDI 155.21     [id number]      VPQ      CPSI-NIH      PGQ          POC Expires Auth Status Start Date Exp Date PT Visit Limit DA expires DA provider                  Date of Service 6/27 7/1 7/15 7/22 7/31 8/8 8/12 8/20    Visits Used            Visits Remaining         "                Manuals            PFM release   30' 30' 45' 35' w/ coconut oil 40' 30' 1/10 pain     Cuing for correct C/R/bear down   15' 15' advised to use vibrator or finger at home        VM       15' as charted 15'                Neuro Re-Ed            BF  55'          C/r/bear down      10'                                                                  Ther Ex            Nustep warmup   L4x8' L4-8' L3x10'   10'                                                                Ther Activity            Education Anatomy and POC                                                                        Modalities

## 2024-11-14 ENCOUNTER — OFFICE VISIT (OUTPATIENT)
Dept: PHYSICAL THERAPY | Facility: REHABILITATION | Age: 74
End: 2024-11-14
Payer: COMMERCIAL

## 2024-11-14 DIAGNOSIS — R10.2 PELVIC PAIN: Primary | ICD-10-CM

## 2024-11-14 DIAGNOSIS — M62.89 PELVIC FLOOR TENSION: ICD-10-CM

## 2024-11-14 DIAGNOSIS — N39.46 MIXED INCONTINENCE URGE AND STRESS: ICD-10-CM

## 2024-11-14 PROCEDURE — 97140 MANUAL THERAPY 1/> REGIONS: CPT | Performed by: PHYSICAL THERAPIST

## 2024-11-14 PROCEDURE — 97530 THERAPEUTIC ACTIVITIES: CPT | Performed by: PHYSICAL THERAPIST

## 2024-11-14 NOTE — PROGRESS NOTES
PT Re-Evaluation     Today's date: 2024  Patient name: Payton Ortiz  : 1950  MRN: [id number]  Referring provider: Claudette Farris, PT  Dx:   No diagnosis found.                 Daily Note     Today's date: 2024  Patient name: Payton Ortiz  : 1950  MRN: [id number]  Referring provider: Claudette Farris, PT  Dx: No diagnosis found.               Subjective: Peg had colonscopy last week and has since had [place] stools 3-4 and bowel movments 3-4 times a day. Continues with what she feels is insensate leakage. Also concerned about much gas lately.         Objective: See treatment diary below      Assessment: Tolerated treatment well. Patient would benefit from continued PT. MFR performed along L LQ in region of hip crease with restriction with   L lateral and L inferior glides as well as pain reported. She did well with manual release internally although she reports allodynia with mild soft tissue restriction. Given bladder leakage concerns, issued HEP of 10 slow holds/10 fast holds to perform 2 times a day.       Plan: Continue per plan of care.      Precautions: There is no problem list on file for this patient.      PRO EVAL RE-EVAL DISCHARGE   PFDI 155.21     [id number]      VPQ      CPSI-NIH      PGQ          POC Expires Auth Status Start Date Exp Date PT Visit Limit DA expires DA provider                    Date of Service 6/27 7/1 7/15 7/22 7/31 8/8 8/12 8/20  11/14   Visits Used             Visits Remaining                          Manuals             PFM release   30' 30' 45' 35' w/ coconut oil 40' 30' 1/10 pain   30'   Cuing for correct C/R/bear down   15' 15' advised to use vibrator or finger at home         VM       15' as charted 15'  L LQ x 15'                Neuro Re-Ed             BF  55'           C/r/bear down      10'                                                                        Ther Ex             Nustep warmup   L4x8' L4-8' L3x10'   10'                                                                       Ther Activity             Education Anatomy and POC          Symptom review. Colonoscopy update; review of scans                                                                      Modalities

## 2024-11-19 ENCOUNTER — OFFICE VISIT (OUTPATIENT)
Dept: PHYSICAL THERAPY | Facility: REHABILITATION | Age: 74
End: 2024-11-19
Payer: COMMERCIAL

## 2024-11-19 DIAGNOSIS — N39.46 MIXED INCONTINENCE URGE AND STRESS: ICD-10-CM

## 2024-11-19 DIAGNOSIS — R10.2 PELVIC PAIN: Primary | ICD-10-CM

## 2024-11-19 DIAGNOSIS — M62.89 PELVIC FLOOR TENSION: ICD-10-CM

## 2024-11-19 PROCEDURE — 97530 THERAPEUTIC ACTIVITIES: CPT | Performed by: PHYSICAL THERAPIST

## 2024-11-19 PROCEDURE — 97140 MANUAL THERAPY 1/> REGIONS: CPT | Performed by: PHYSICAL THERAPIST

## 2024-11-19 PROCEDURE — 97110 THERAPEUTIC EXERCISES: CPT | Performed by: PHYSICAL THERAPIST

## 2024-11-19 NOTE — PROGRESS NOTES
"          Daily Note     Today's date: 2024  Patient name: Payton Ortiz  : 1950  MRN: [id number]  Referring provider: Claudette Farris, PT  Dx:   Encounter Diagnosis     ICD-10-CM    1. Pelvic pain  R10.2       2. Pelvic floor tension  M62.89       3. Mixed incontinence urge and stress  N39.46         10/31/24 update: Payton Ortiz is a 74 y.o. female who presents with continued pelvic pain, lower abodminal discomfort and urinary leakage. She does have improved tolerance to intercourse and improve bowel movement consistency since start of care. Examination reveals pelvic muscle tension, poor holding endurance, tenderness to palpation, poor cooridnation with deep core stabilizers.              Subjective: Had a CAT scan with dye since last visit and she feels that it worsened urinary urgency. She reports that it was negative other than umbilical hernia per patient report. She reports that yesterday she was outside working in the yard and she had a little breathing challenge \"like when I need my inhaler\"  as well as some leg weakness. Scheduled for EKG and has PT order for strengthening of lower extremities (\"but I don't have time\")         Objective: See treatment diary below      Assessment: Tolerated treatment well. Patient would benefit from continued PT. Manual work reveals tension on deep right sided pelvic floor muscles, especially deep iliooccygeus. She reports this is tender and one of her areas of pain. It reduced fairly nicely. She was encouraged to have a urinalysis to out UTI or vaginal infection due to more recent burning and urinary frequency patient expresses understanding and states that she will.      Plan: Continue per plan of care.      Precautions: There is no problem list on file for this patient.      PRO EVAL RE-EVAL DISCHARGE   PFDI 155.21 119.8,152.08    [id number]      VPQ      CPSI-NIH      PGQ          POC Expires Auth Status Start Date Exp Date PT Visit Limit DA " expires DA provider                    Date of Service 6/27 7/1 7/15 7/22 7/31 8/8 8/12 8/20 11/5 11/14 11/19   Visits Used              Visits Remaining                            Manuals              PFM release   30' 30' 45' 35' w/ coconut oil 40' 30' 1/10 pain   30' 25'   Cuing for correct C/R/bear down   15' 15' advised to use vibrator or finger at home          VM       15' as charted 15'  L LQ x 15' 10'                 Neuro Re-Ed              BF  55'            C/r/bear down      10'                                                                              Ther Ex              Nustep warmup   L4x8' L4-8' L3x10'   10'   10'                                                                         Ther Activity              Education Anatomy and POC          Symptom review. Colonoscopy update; review of scans Urine sx and recs                                                                           Modalities

## 2024-11-25 ENCOUNTER — OFFICE VISIT (OUTPATIENT)
Dept: PHYSICAL THERAPY | Facility: REHABILITATION | Age: 74
End: 2024-11-25
Payer: COMMERCIAL

## 2024-11-25 DIAGNOSIS — N39.46 MIXED INCONTINENCE URGE AND STRESS: ICD-10-CM

## 2024-11-25 DIAGNOSIS — M62.89 PELVIC FLOOR TENSION: ICD-10-CM

## 2024-11-25 DIAGNOSIS — R10.2 PELVIC PAIN: Primary | ICD-10-CM

## 2024-11-25 PROCEDURE — 97140 MANUAL THERAPY 1/> REGIONS: CPT | Performed by: PHYSICAL THERAPIST

## 2024-11-25 PROCEDURE — 97110 THERAPEUTIC EXERCISES: CPT | Performed by: PHYSICAL THERAPIST

## 2024-11-25 NOTE — PROGRESS NOTES
"          Daily Note     Today's date: 2024  Patient name: Payton Ortiz  : 1950  MRN: [id number]  Referring provider: Claudette Farris, PT  Dx:   Encounter Diagnosis     ICD-10-CM    1. Pelvic pain  R10.2       2. Pelvic floor tension  M62.89       3. Mixed incontinence urge and stress  N39.46         10/31/24 update: Payton Ortiz is a 74 y.o. female who presents with continued pelvic pain, lower abodminal discomfort and urinary leakage. She does have improved tolerance to intercourse and improve bowel movement consistency since start of care. Examination reveals pelvic muscle tension, poor holding endurance, tenderness to palpation, poor cooridnation with deep core stabilizers.              Subjective: Did not seek urinalysis after visit as her burning subsided. She has been compliant with her kegels \"on and off throughout the day.\" Still feels like something is trickling out.\" She has less burning since last PT visit. Daily voiding is every 2 1/2 hours but waking every 2 1/2 hours at night to void as well. She has had good bowel movements lately.     Objective: See treatment diary below      Assessment: Tolerated treatment well. Patient would benefit from continued PT. Superficial PFM tissue is mildly TTP but response well to manual treatment with coconut oil. Progressing fairly well overall.       Plan: Continue per plan of care.      Precautions: There is no problem list on file for this patient.      PRO EVAL RE-EVAL DISCHARGE   PFDI 155.21 119.8,152.08    [id number]      VPQ      CPSI-NIH      PGQ          POC Expires Auth Status Start Date Exp Date PT Visit Limit DA expires DA provider                    Date of Service      Visits Used            Visits Remaining                        Manuals            PFM release 35' w/ coconut oil 40' 30' 1/10 pain   30' 25' Internal and external 45'     Cuing for correct C/R/bear down            VM  15' as " charted 15'  L LQ x 15' 10'                  Neuro Re-Ed            BF            C/r/bear down 10'                                                                       Ther Ex            Nustep warmup   10'   10' 10'                                                                 Ther Activity            Education     Symptom review. Colonoscopy update; review of scans Urine sx and recs                                                                  Modalities

## 2024-12-06 ENCOUNTER — APPOINTMENT (OUTPATIENT)
Dept: PHYSICAL THERAPY | Facility: REHABILITATION | Age: 74
End: 2024-12-06
Payer: COMMERCIAL

## 2024-12-09 ENCOUNTER — APPOINTMENT (OUTPATIENT)
Dept: PHYSICAL THERAPY | Facility: REHABILITATION | Age: 74
End: 2024-12-09
Payer: COMMERCIAL

## 2024-12-09 ENCOUNTER — OFFICE VISIT (OUTPATIENT)
Dept: PHYSICAL THERAPY | Facility: REHABILITATION | Age: 74
End: 2024-12-09
Payer: COMMERCIAL

## 2024-12-09 DIAGNOSIS — R10.2 PELVIC PAIN: Primary | ICD-10-CM

## 2024-12-09 DIAGNOSIS — M62.89 PELVIC FLOOR TENSION: ICD-10-CM

## 2024-12-09 DIAGNOSIS — N39.46 MIXED INCONTINENCE URGE AND STRESS: ICD-10-CM

## 2024-12-09 PROCEDURE — 97140 MANUAL THERAPY 1/> REGIONS: CPT | Performed by: PHYSICAL THERAPIST

## 2024-12-09 PROCEDURE — 97110 THERAPEUTIC EXERCISES: CPT | Performed by: PHYSICAL THERAPIST

## 2024-12-09 NOTE — PROGRESS NOTES
"          Daily Note     Today's date: 2024  Patient name: Payton Ortiz  : 1950  MRN: [id number]  Referring provider: Claudette Farris, PT  Dx:   Encounter Diagnosis     ICD-10-CM    1. Pelvic pain  R10.2       2. Pelvic floor tension  M62.89       3. Mixed incontinence urge and stress  N39.46           10/31/24 update: Payton Ortiz is a 74 y.o. female who presents with continued pelvic pain, lower abodminal discomfort and urinary leakage. She does have improved tolerance to intercourse and improve bowel movement consistency since start of care. Examination reveals pelvic muscle tension, poor holding endurance, tenderness to palpation, poor cooridnation with deep core stabilizers.              Subjective: Had a urinalysis done 2 days ago and it came back negative. She had Noy Karyna touch treatment 2 weeks ago. Was advised to increase Estradiol to 0.75 g three times weekly. \"I got too many things doctors want me to do and I hope I don't forget.\" She has not been compliant with dilator stretching.     Objective: See treatment diary below      Assessment: Tolerated treatment well. Patient would benefit from continued PT. Superficial PFM tissue is moderately TTP along 5 o'clock introitus on patient's R side but responds well to manual treatment with coconut oil. Less discomfort along deeper tissue recently.       Plan: Continue per plan of care.      Precautions: There is no problem list on file for this patient.      PRO EVAL RE-EVAL DISCHARGE   PFDI 155.21 119.8,152.08    [id number]      VPQ      CPSI-NIH      PGQ          POC Expires Auth Status Start Date Exp Date PT Visit Limit DA expires DA provider                    Date of Service  12.9    Visits Used            Visits Remaining                        Manuals            PFM release 35' w/ coconut oil 40' 30' 1/10 pain   30' 25' Internal and external 45' Internal and external 45'    Cuing for correct " C/R/bear down            VM  15' as charted 15'  L LQ x 15' 10'                  Neuro Re-Ed            BF            C/r/bear down 10'                                                                       Ther Ex            Nustep warmup   10'   10' 10' 9'                                                                Ther Activity            Education     Symptom review. Colonoscopy update; review of scans Urine sx and recs                                                                  Modalities

## 2024-12-09 NOTE — PROGRESS NOTES
"          Daily Note     Today's date: 2024  Patient name: Payton Ortiz  : 1950  MRN: [id number]  Referring provider: Claudette Farris, PT  Dx:   Encounter Diagnosis     ICD-10-CM    1. Pelvic pain  R10.2       2. Pelvic floor tension  M62.89       3. Mixed incontinence urge and stress  N39.46         10/31/24 update: Payton Ortiz is a 74 y.o. female who presents with continued pelvic pain, lower abodminal discomfort and urinary leakage. She does have improved tolerance to intercourse and improve bowel movement consistency since start of care. Examination reveals pelvic muscle tension, poor holding endurance, tenderness to palpation, poor cooridnation with deep core stabilizers.              Subjective: Did not seek urinalysis after visit as her burning subsided. She has been compliant with her kegels \"on and off throughout the day.\" Still feels like something is trickling out.\" She has less burning since last PT visit. Daily voiding is every 2 1/2 hours but waking every 2 1/2 hours at night to void as well. She has had good bowel movements lately.     Objective: See treatment diary below      Assessment: Tolerated treatment well. Patient would benefit from continued PT. Superficial PFM tissue is mildly TTP but response well to manual treatment with coconut oil. Progressing fairly well overall.       Plan: Continue per plan of care.      Precautions: There is no problem list on file for this patient.      PRO EVAL RE-EVAL DISCHARGE   PFDI 155.21 119.8,152.08    [id number]      VPQ      CPSI-NIH      PGQ          POC Expires Auth Status Start Date Exp Date PT Visit Limit DA expires DA provider                    Date of Service      Visits Used            Visits Remaining                        Manuals            PFM release 35' w/ coconut oil 40' 30' 1/10 pain   30' 25' Internal and external 45'     Cuing for correct C/R/bear down            VM  15' as " charted 15'  L LQ x 15' 10'                  Neuro Re-Ed            BF            C/r/bear down 10'                                                                       Ther Ex            Nustep warmup   10'   10' 10'                                                                 Ther Activity            Education     Symptom review. Colonoscopy update; review of scans Urine sx and recs                                                                  Modalities

## 2024-12-16 NOTE — PROGRESS NOTES
"          Daily Note     Today's date: 2024  Patient name: Payton Ortiz  : 1950  MRN: [id number]  Referring provider: Claudette Farris, PT  Dx:   Encounter Diagnosis     ICD-10-CM    1. Pelvic pain  R10.2       2. Pelvic floor tension  M62.89       3. Mixed incontinence urge and stress  N39.46           10/31/24 update: Payton Ortiz is a 74 y.o. female who presents with continued pelvic pain, lower abodminal discomfort and urinary leakage. She does have improved tolerance to intercourse and improve bowel movement consistency since start of care. Examination reveals pelvic muscle tension, poor holding endurance, tenderness to palpation, poor cooridnation with deep core stabilizers.                                                                                                                                                                                 Subjective: Had a urinalysis done 2 days ago and it came back negative. She had Noy Karyna touch treatment 2 weeks ago. Was advised to increase Estradiol to 0.75 g three times weekly. \"I got too many things doctors want me to do and I hope I don't forget.\" She has not been compliant with dilator stretching.     Objective: See treatment diary below      Assessment: Tolerated treatment well. Patient would benefit from continued PT. Added neutral spinealternating marches and lower trunk rotations following treadmill warm-up this visit due to concern of lower back pain. Deep pelvic floor has improved muscle pliability, but her right side is still with some restriction. She tolerated manual stretching with good response despite moderate discomfort. Encouraged to use her estradiol as prescribed since she has been forgetting recently. We'll see her in two weeks again        Plan: Continue per plan of care.      Precautions: There is no problem list on file for this patient.      PRO EVAL RE-EVAL DISCHARGE    PFDI 155.21 119.8,152.08    WILFREDO-18    " "  VPQ      CPSI-NIH      PGQ          POC Expires Auth Status Start Date Exp Date PT Visit Limit DA expires DA provider                    Date of Service 8/8 8/12 8/20 11/5 11/14 11/19 11/25 12.9 12/17   Visits Used            Visits Remaining                        Manuals            PFM release 35' w/ coconut oil 40' 30' 1/10 pain   30' 25' Internal and external 45' Internal and external 45' 40'   Cuing for correct C/R/bear down            VM  15' as charted 15'  L LQ x 15' 10'                  Neuro Re-Ed            BF            C/r/bear down 10'                                                                       Ther Ex            Nustep warmup   10'   10' 10' 9' 10'                                                               Ther Activity            Education     Symptom review. Colonoscopy update; review of scans Urine sx and recs      PPT with spencer         2x10   LTR         10\"x10                                       Modalities                                           "

## 2024-12-17 ENCOUNTER — OFFICE VISIT (OUTPATIENT)
Dept: PHYSICAL THERAPY | Facility: REHABILITATION | Age: 74
End: 2024-12-17
Payer: COMMERCIAL

## 2024-12-17 DIAGNOSIS — R10.2 PELVIC PAIN: Primary | ICD-10-CM

## 2024-12-17 DIAGNOSIS — N39.46 MIXED INCONTINENCE URGE AND STRESS: ICD-10-CM

## 2024-12-17 DIAGNOSIS — M62.89 PELVIC FLOOR TENSION: ICD-10-CM

## 2024-12-17 PROCEDURE — 97110 THERAPEUTIC EXERCISES: CPT | Performed by: PHYSICAL THERAPIST

## 2024-12-17 PROCEDURE — 97140 MANUAL THERAPY 1/> REGIONS: CPT | Performed by: PHYSICAL THERAPIST

## 2024-12-29 NOTE — PROGRESS NOTES
Daily Note     Today's date: 2024  Patient name: Payton Ortiz  : 1950  MRN: [id number]  Referring provider: Claudette Farris, PT  Dx:   Encounter Diagnosis     ICD-10-CM    1. Pelvic pain  R10.2       2. Pelvic floor tension  M62.89       3. Mixed incontinence urge and stress  N39.46           10/31/24 update: Payton Ortiz is a 74 y.o. female who presents with continued pelvic pain, lower abodminal discomfort and urinary leakage. She does have improved tolerance to intercourse and improve bowel movement consistency since start of care. Examination reveals pelvic muscle tension, poor holding endurance, tenderness to palpation, poor cooridnation with deep core stabilizers.                                                                                                                                                                                 Subjective: Patient continues to have some cough from sinus infections.     Objective: See treatment diary below      Assessment: Tolerated treatment well. Patient would benefit from continued PT. We deferred active exercise program today due to not feeling very well. She reported burning R deep LA but her tissue is looking better since she is increased her estradiol.         Plan: Continue per plan of care.      Precautions: There is no problem list on file for this patient.      PRO EVAL RE-EVAL DISCHARGE    PFDI 155.21 119.8,152.08    WILFREDO-18      VPQ      CPSI-NIH      PGQ          POC Expires Auth Status Start Date Exp Date PT Visit Limit DA expires DA provider                    Date of Service  12.9    Visits Used             Visits Remaining                          Manuals             PFM release 35' w/ coconut oil 40' 30' 1/10 pain   30' 25' Internal and external 45' Internal and external 45' 40' 40'   Cuing for correct C/R/bear down             VM  15' as charted 15'  L LQ x 15'  "10'                    Neuro Re-Ed             BF             C/r/bear down 10'                                                                             Ther Ex             Nustep warmup   10'   10' 10' 9' 10'                                                                     Ther Activity             Education     Symptom review. Colonoscopy update; review of scans Urine sx and recs       PPT with spencer         2x10    LTR         10\"x10                                           Modalities                                              "

## 2024-12-30 ENCOUNTER — OFFICE VISIT (OUTPATIENT)
Dept: PHYSICAL THERAPY | Facility: REHABILITATION | Age: 74
End: 2024-12-30
Payer: COMMERCIAL

## 2024-12-30 DIAGNOSIS — R10.2 PELVIC PAIN: Primary | ICD-10-CM

## 2024-12-30 DIAGNOSIS — M62.89 PELVIC FLOOR TENSION: ICD-10-CM

## 2024-12-30 DIAGNOSIS — N39.46 MIXED INCONTINENCE URGE AND STRESS: ICD-10-CM

## 2024-12-30 PROCEDURE — 97140 MANUAL THERAPY 1/> REGIONS: CPT | Performed by: PHYSICAL THERAPIST

## 2025-01-06 NOTE — PROGRESS NOTES
Daily Note     Today's date: 2025  Patient name: Payton Ortiz  : 1950  MRN: [id number]  Referring provider: Claudette Farris, PT  Dx:   Encounter Diagnosis     ICD-10-CM    1. Pelvic pain  R10.2       2. Mixed incontinence urge and stress  N39.46       3. Pelvic floor tension  M62.89           10/31/24 update: Payton Ortiz is a 74 y.o. female who presents with continued pelvic pain, lower abodminal discomfort and urinary leakage. She does have improved tolerance to intercourse and improve bowel movement consistency since start of care. Examination reveals pelvic muscle tension, poor holding endurance, tenderness to palpation, poor cooridnation with deep core stabilizers.                                                                                                                                                                                 Subjective: Patient is wearing a mask today. She has been feeling a little better with sinus infection but taking abx still.     Objective: See treatment diary below      Assessment: Tolerated treatment well. Patient would benefit from continued PT. Able to resume active warm up today.  With pelvic floor muscle palpation she reported some burning and discomfort along right obturator internus and periurethral tissue as well as superficial vaginal introitus.  This did reduce but not fully with manual techniques.  We discussed the fact that she is taking an antibiotic and we use coconut oil in her session so I recommended that she take a probiotic with yogurt today and hydrate well with water in order to prevent a yeast infection.  Patient expressed good understanding    Plan: Continue per plan of care.      Precautions: There is no problem list on file for this patient.      PRO EVAL RE-EVAL DISCHARGE    PFDI 155.21 119.8,152.08    WILFREDO-18      VPQ      CPSI-NIH      PGQ          POC Expires Auth Status Start Date Exp Date PT Visit Limit DA  "expires DA provider                    Date of Service 11/19 11/25 12.9 12/17 12/20 1/7   Visits Used         Visits Remaining                  Manuals         PFM release 25' Internal and external 45' Internal and external 45' 40' 40' 40'   Cuing for correct C/R/bear down         VM 10'                 Neuro Re-Ed         BF         C/r/bear down                                                      Ther Ex         Nustep warmup 10' 10' 9' 10'  10'                                                Ther Activity         Education Urine sx and recs        PPT with spencer    2x10     LTR    10\"x10                                Modalities                                  "

## 2025-01-07 ENCOUNTER — OFFICE VISIT (OUTPATIENT)
Dept: PHYSICAL THERAPY | Facility: REHABILITATION | Age: 75
End: 2025-01-07
Payer: COMMERCIAL

## 2025-01-07 DIAGNOSIS — N39.46 MIXED INCONTINENCE URGE AND STRESS: ICD-10-CM

## 2025-01-07 DIAGNOSIS — R10.2 PELVIC PAIN: Primary | ICD-10-CM

## 2025-01-07 DIAGNOSIS — M62.89 PELVIC FLOOR TENSION: ICD-10-CM

## 2025-01-07 PROCEDURE — 97140 MANUAL THERAPY 1/> REGIONS: CPT | Performed by: PHYSICAL THERAPIST

## 2025-01-07 PROCEDURE — 97110 THERAPEUTIC EXERCISES: CPT | Performed by: PHYSICAL THERAPIST

## 2025-01-13 ENCOUNTER — OFFICE VISIT (OUTPATIENT)
Dept: PHYSICAL THERAPY | Facility: REHABILITATION | Age: 75
End: 2025-01-13
Payer: COMMERCIAL

## 2025-01-13 DIAGNOSIS — N39.46 MIXED INCONTINENCE URGE AND STRESS: ICD-10-CM

## 2025-01-13 DIAGNOSIS — M62.89 PELVIC FLOOR TENSION: ICD-10-CM

## 2025-01-13 DIAGNOSIS — R10.2 PELVIC PAIN: Primary | ICD-10-CM

## 2025-01-13 PROCEDURE — 97110 THERAPEUTIC EXERCISES: CPT | Performed by: PHYSICAL THERAPIST

## 2025-01-13 PROCEDURE — 97140 MANUAL THERAPY 1/> REGIONS: CPT | Performed by: PHYSICAL THERAPIST

## 2025-01-13 NOTE — PROGRESS NOTES
Daily Note     Today's date: 2025  Patient name: Payton Ortiz  : 1950  MRN: [id number]  Referring provider: Claudette Farris, PT  Dx:   Encounter Diagnosis     ICD-10-CM    1. Pelvic pain  R10.2       2. Mixed incontinence urge and stress  N39.46       3. Pelvic floor tension  M62.89           10/31/24 update: Payton Ortiz is a 74 y.o. female who presents with continued pelvic pain, lower abodminal discomfort and urinary leakage. She does have improved tolerance to intercourse and improve bowel movement consistency since start of care. Examination reveals pelvic muscle tension, poor holding endurance, tenderness to palpation, poor cooridnation with deep core stabilizers.                                                                                                                                                                                 Subjective: Finished antibiotics but she feels as though her PF is sore. She has not used dilators recently.     Objective: See treatment diary below      Assessment: Tolerated treatment well. Patient would benefit from continued PT.  Patient brought different water-based lubricant from intermittent versus today and responded well with little drying out.  She reports soreness but her attention to has improved.  We discussed staying active and she has an electric recumbent bike that she uses in good weather and will be going skiing next week.  Reassessment next visit to determine plan of care going forward    Plan: Continue per plan of care.      Precautions: There is no problem list on file for this patient.      PRO EVAL RE-EVAL DISCHARGE    PFDI 155.21 119.8,152.08    WILFREDO-18      VPQ      CPSI-NIH      PGQ          POC Expires Auth Status Start Date Exp Date PT Visit Limit DA expires DA provider                    Date of Service  12.9    Visits Used          Visits Remaining                    Manuals      "     PFM release 25' Internal and external 45' Internal and external 45' 40' 40' 40' 40'   Cuing for correct C/R/bear down          VM 10'                   Neuro Re-Ed          BF          C/r/bear down                                                            Ther Ex          Nustep warmup 10' 10' 9' 10'  10' Seat #8, arms #7                                                     Ther Activity          Education Urine sx and recs         PPT with spencer    2x10      LTR    10\"x10                                    Modalities                                     "

## 2025-01-21 ENCOUNTER — APPOINTMENT (OUTPATIENT)
Dept: PHYSICAL THERAPY | Facility: REHABILITATION | Age: 75
End: 2025-01-21
Payer: COMMERCIAL

## 2025-01-21 NOTE — PROGRESS NOTES
Daily Note     Today's date: 2025  Patient name: Payton Ortiz  : 1950  MRN: [id number]  Referring provider: Claudette Farris, PT  Dx:   Encounter Diagnosis     ICD-10-CM    1. Pelvic pain  R10.2       2. Mixed incontinence urge and stress  N39.46       3. Pelvic floor tension  M62.89             10/31/24 update: Payton Ortiz is a 74 y.o. female who presents with continued pelvic pain, lower abodminal discomfort and urinary leakage. She does have improved tolerance to intercourse and improve bowel movement consistency since start of care. Examination reveals pelvic muscle tension, poor holding endurance, tenderness to palpation, poor cooridnation with deep core stabilizers.                                                                                                                                                                                 Subjective: Reports that she has a cold today.  She has not done any  dilator training at home  for this reason.    Objective: See treatment diary below      Assessment: Tolerated treatment well. Patient would benefit from continued PT. did well with manual therapy final session today.  She continues with moderate tenderness along her left iliococcygeus that softens nicely with manual techniques.  For this reason, we both agree that we can discharge at this time and have her work   independently with her dilators.  Encouraged her to reach out with any questions or problems in the future.    Plan:  Discharge PT.      Precautions: There is no problem list on file for this patient.      PRO EVAL RE-EVAL DISCHARGE    PFDI 155.21 119.8,152.08    WILFREDO-18      VPQ      CPSI-NIH      PGQ          POC Expires Auth Status Start Date Exp Date PT Visit Limit DA expires DA provider                    Date of Service  12.9    Visits Used            Visits Remaining                        Manuals            PFM  "release 25' Internal and external 45' Internal and external 45' 40' 40' 40' 40'  40'   Cuing for correct C/R/bear down            VM 10'                       Neuro Re-Ed            BF            C/r/bear down                                                                        Ther Ex            Nustep warmup 10' 10' 9' 10'  10' Seat #8, arms #7  10'                                                               Ther Activity            Education Urine sx and recs        DC instructions   PPT with spencer    2x10        LTR    10\"x10                                            Modalities                                           "

## 2025-01-23 ENCOUNTER — EVALUATION (OUTPATIENT)
Dept: PHYSICAL THERAPY | Facility: REHABILITATION | Age: 75
End: 2025-01-23
Payer: COMMERCIAL

## 2025-01-23 DIAGNOSIS — M62.89 PELVIC FLOOR TENSION: ICD-10-CM

## 2025-01-23 DIAGNOSIS — R10.2 PELVIC PAIN: Primary | ICD-10-CM

## 2025-01-23 DIAGNOSIS — N39.46 MIXED INCONTINENCE URGE AND STRESS: ICD-10-CM

## 2025-01-23 PROCEDURE — 97110 THERAPEUTIC EXERCISES: CPT | Performed by: PHYSICAL THERAPIST

## 2025-01-23 PROCEDURE — 97164 PT RE-EVAL EST PLAN CARE: CPT | Performed by: PHYSICAL THERAPIST

## 2025-01-23 PROCEDURE — 97140 MANUAL THERAPY 1/> REGIONS: CPT | Performed by: PHYSICAL THERAPIST

## 2025-01-23 NOTE — PROGRESS NOTES
PT Re-Evaluation     Today's date: 2025  Patient name: Payton Ortiz  : 1950  MRN: [id number]  Referring provider: Claudette Farris PT  Dx:   Encounter Diagnosis     ICD-10-CM    1. Pelvic pain  R10.2       2. Mixed incontinence urge and stress  N39.46       3. Pelvic floor tension  M62.89                        Assessment  Impairments: abnormal coordination, abnormal muscle firing, abnormal muscle tone, abnormal or restricted ROM, activity intolerance, impaired physical strength, lacks appropriate home exercise program and pain with function  Symptom irritability: moderate    Assessment details: Payton Ortiz is a 74 y.o. female who presents with continued pelvic pain, lower abodminal discomfort and urinary leakage. She does have improved tolerance to intercourse and improve bowel movement consistency since start of care. Examination reveals pelvic muscle tension, poor holding endurance, tenderness to palpation, poor cooridnation with deep core stabilizers.       The plan of care was discussed and included education regarding pelvic floor anatomy, explanation of exam technique, explanation of exam findings and discussion of treatment plan as well as the importance of patient compliance and adherence to physical therapy visits. Patient would benefit from skilled physical therapy services  to address deficits and ultimately meet goal of independent self management of condition.     Patient provided written and verbal consent for pelvic floor muscle exam: yes        Understanding of Dx/Px/POC: good     Prognosis: good    Goals    STGs to be met in 4 weeks:  * Patient will be compliant with introductory HEP as prescribed. MET  * Patient presents with good understanding of pelvic floor protective strategies to reduce intra-abdominal pressure against pelvic organs. ONGOING    LTGs to be met by discharge:  * Patient will present with a NATHAN on the PFDI to indicate improved function. (nonsurg NATHAN  "> 13.5 pts, surg NATHAN > 45 pts) TBA  * Demonstrates correct isolation and relaxation of pelvic floor to palpation without overflow from global stabilizers. ONGOING  * Reports that she/he can cough/laugh/sneeze with at least 80% less bladder leakage. MET  * Implements relaxation strategies on a daily basis.ONGOING  * Patient will report 80% reduction in discomfort with either palpation or intimacy. NOT MET  * Patient will be proficient and compliant with use of vaginal dilator (s) for progression of self PFM stretching in 3-4 visits if indicated during treatment.  ONGOING  * Presents with improved nocturia to 1 toiletings/night for more thorough sleep. NOT MET  * Patient will use pelvic floor muscles correctly during functional ADLs such as coughing, sneezn. MET    Plan  Patient would benefit from: skilled physical therapy  Referral necessary: Yes    Planned therapy interventions: manual therapy, motor coordination training, neuromuscular re-education, strengthening, stretching, therapeutic activities, therapeutic exercise, home exercise program, behavior modification and breathing training    Frequency: 1x week  Duration in weeks: 12  Plan of Care beginning date: 10/31/2024  Plan of Care expiration date: 1/23/2025  Treatment plan discussed with: patient    PT Pelvic Floor Subjective:   History of Present Illness:   4 weeks with no infusion and she reports more pain along many joints. L knee pain which worsens when she goes downstairs.     She is happy with less abdominal pain and diminished bladder urgency with intermittent urge leakage. She has not used her bladder dilators recently as she was concerned about a yeast infection and she feels like it helped but my .   Patient returns after a span of time due to busy schedule. She continues with concern of bladder leakage (often insensate) I haven't been able to do any of my self release work with my dilator.\"    Pain is with touch along vaginal introitus \"If I don't " "touch it, it doesn't hurt.\" She reports that she has very little pain but the last time she had intercourse she bled. She underwent pelvic ultrasound which indicated 10.6mm endometrial lining. Hysteroscopy attempted but but MD schmitt't insert myosure but she was able to do a D&C which revealed wnl endometrial lining.     She was placed on an antibiotic for recent UTI. She is wearing menstrual pads to control insensate bladder leakage.    Quality of life: good    Social Support:     Lives in:  Multiple-level home    Lives with:  Spouse    Relationship status: /committed  Hand dominance:  Right  Diet and Exercise:      None due to time     Not exercising due to: lack of time  OB/ gyn History    Gestational History:     Number of prior pregnancies: 4    Number of term pregnancies: 2    Delivery Complications:  Living children are 50 and 49 yo. She lost 2 babies shortly after birth.   Bladder Function:      Voiding Difficulties comments:     Voiding frequency: every 1-2 hours and every 3-4 hours    Urinary leakage: urine leakage    Urinary leakage aggravated by: sneezing and walking to the bathroom    Nocturia (episodes per night): 1    Intake (ounces): Water intake (oz): 32 ounces.     Intake (ounces) comment: Less insensate bladder leakdge  Incontinence Management:     Pads/Diaper Use:  24 hours  Bowel Function:     Voiding DIfficulties: constipation      Bowel Function comments:  Anal cancer in 2009 - treated with radiation and chemotherapy    Bowel frequency: multiple times a day    Burt Stool Scale: type 4 and type 3  Sexual Function:     Sexually Active:  Sexually active    Pain during intercourse: Yes      Lubrication Use: Yes    pain causes abstinence    Patient wishes to return to having intercourse: currently unable to have intercourse but wants toSexual function: vaginal pain    She has estradiol cream which she wants to talk to Alia about increasing amount. Currently applying one time per " "week. :  Pain:     Current pain ratin    At best pain ratin    At worst pain rating:  10    Location:  Vaginal, lower pelvic pain    Onset:  More than 2 years ago    Quality:  Sharp and burning    Aggravating factors:  Unknown    Duration of symptoms:  6 to 24 hours    Relieving factors:  Change in position    Progression:  No change  Diagnostic Tests:     None    Treatments:     None    Patient Goals:     Patient goals for therapy:  Improved pain management, improved quality of life, relaxation, improved comfort, improved bladder or bowel function, fully empty bladder or bowels and decreased pain    Objective       Abdominal Assessment:      Abdominal Assessment: NA      General Perineum Exam:   Positive for perianal erythema.     General perineum exam comments: No discharge, irritation, organ prolapse or skin breakdown evident. Vitilago evident throughout perineum and gluteal muscles.     Reports burning pain along R caudal labia that rates 5-6/10. \"That is the area that hurts.\"     Also reports discomfort rating 4/10 along R IC and 5/10 along L IC and periurethral tissue.     Visual Inspection of Perineum:   Excursion of perineal body in cephalad direction with contraction of pelvic floor muscles (PFM): delayed  and weak  Excursion of perineal body in caudal direction with relaxation of pelvic floor muscles (PFM): weak and fair   Involuntary contraction with coughing: no  Involuntary relaxation with bearing down: yes  Cough reflex: no cough reflex  Sphincter Tone Resting: normal  Sphincter Tone Squeeze: normal  Sensation: intact  Tenderness: provoked    Pelvic Organ Prolapse   no pelvic organ prolapse   Atrophic changes: erythema noted       Pelvic Floor Muscle Exam:     Muscle Contraction: unable to perform   Breathing pattern with contraction: holding breath   Pelvic floor muscle relaxation is delayed and incomplete.   50% pelvic floor relaxation        PERFECT Score   Power right: 2/5   Power left: " "2/5   Endurance (seconds to max): 4   Repetitions (before fatigue): 4        pelvic floor exam consent given by patient    Pelvic exam completed: vaginally     SMEG Biofeedback   to be assessed next treatment    Graphical documentation:         She has estradiol cream which she wants to talk to Alia about increasing amount. Currently applying one time per week.              Precautions: There is no problem list on file for this patient.        PRO EVAL RE-EVAL DISCHARGE    PFDI 155.21 119.8,152.08    WILFREDO-18      VPQ      CPSI-NIH      PGQ              Date of Service 11/19 11/25 12.9 12/17 12/20 1/7 1/13 1/23   Visits Used           Visits Remaining                      Manuals           PFM release 25' Internal and external 45' Internal and external 45' 40' 40' 40' 40' 40'   Cuing for correct C/R/bear down           VM 10'                     Neuro Re-Ed           BF           C/r/bear down                                                                  Ther Ex           Nustep warmup 10' 10' 9' 10'  10' Seat #8, arms #7 10'                                                          Ther Activity           Education Urine sx and recs          PPT with spencer    2x10       LTR    10\"x10                                        Modalities                                   "

## 2025-01-28 ENCOUNTER — OFFICE VISIT (OUTPATIENT)
Dept: PHYSICAL THERAPY | Facility: REHABILITATION | Age: 75
End: 2025-01-28
Payer: COMMERCIAL

## 2025-01-28 DIAGNOSIS — N39.46 MIXED INCONTINENCE URGE AND STRESS: ICD-10-CM

## 2025-01-28 DIAGNOSIS — M62.89 PELVIC FLOOR TENSION: ICD-10-CM

## 2025-01-28 DIAGNOSIS — R10.2 PELVIC PAIN: Primary | ICD-10-CM

## 2025-01-28 PROCEDURE — 97140 MANUAL THERAPY 1/> REGIONS: CPT | Performed by: PHYSICAL THERAPIST

## 2025-01-28 PROCEDURE — 97110 THERAPEUTIC EXERCISES: CPT | Performed by: PHYSICAL THERAPIST
